# Patient Record
Sex: MALE | Race: WHITE | NOT HISPANIC OR LATINO | URBAN - METROPOLITAN AREA
[De-identification: names, ages, dates, MRNs, and addresses within clinical notes are randomized per-mention and may not be internally consistent; named-entity substitution may affect disease eponyms.]

---

## 2020-01-18 ENCOUNTER — EMERGENCY (EMERGENCY)
Facility: HOSPITAL | Age: 20
LOS: 1 days | Discharge: HOME | End: 2020-01-18
Attending: EMERGENCY MEDICINE | Admitting: EMERGENCY MEDICINE
Payer: COMMERCIAL

## 2020-01-18 VITALS
HEART RATE: 90 BPM | DIASTOLIC BLOOD PRESSURE: 78 MMHG | TEMPERATURE: 98 F | SYSTOLIC BLOOD PRESSURE: 125 MMHG | OXYGEN SATURATION: 94 % | RESPIRATION RATE: 18 BRPM

## 2020-01-18 PROCEDURE — 71045 X-RAY EXAM CHEST 1 VIEW: CPT | Mod: 26

## 2020-01-18 PROCEDURE — 99291 CRITICAL CARE FIRST HOUR: CPT

## 2020-01-18 RX ORDER — IPRATROPIUM/ALBUTEROL SULFATE 18-103MCG
3 AEROSOL WITH ADAPTER (GRAM) INHALATION
Refills: 0 | Status: COMPLETED | OUTPATIENT
Start: 2020-01-18 | End: 2020-01-18

## 2020-01-18 RX ORDER — EPINEPHRINE 0.3 MG/.3ML
0.3 INJECTION INTRAMUSCULAR; SUBCUTANEOUS ONCE
Refills: 0 | Status: COMPLETED | OUTPATIENT
Start: 2020-01-18 | End: 2020-01-18

## 2020-01-18 RX ORDER — ALBUTEROL 90 UG/1
2 AEROSOL, METERED ORAL
Qty: 1 | Refills: 0
Start: 2020-01-18

## 2020-01-18 RX ORDER — FAMOTIDINE 10 MG/ML
1 INJECTION INTRAVENOUS
Qty: 5 | Refills: 0
Start: 2020-01-18 | End: 2020-01-22

## 2020-01-18 RX ORDER — FAMOTIDINE 10 MG/ML
20 INJECTION INTRAVENOUS ONCE
Refills: 0 | Status: COMPLETED | OUTPATIENT
Start: 2020-01-18 | End: 2020-01-18

## 2020-01-18 RX ORDER — SODIUM CHLORIDE 9 MG/ML
1000 INJECTION INTRAMUSCULAR; INTRAVENOUS; SUBCUTANEOUS ONCE
Refills: 0 | Status: COMPLETED | OUTPATIENT
Start: 2020-01-18 | End: 2020-01-18

## 2020-01-18 RX ORDER — ONDANSETRON 8 MG/1
4 TABLET, FILM COATED ORAL ONCE
Refills: 0 | Status: COMPLETED | OUTPATIENT
Start: 2020-01-18 | End: 2020-01-18

## 2020-01-18 RX ORDER — EPINEPHRINE 0.3 MG/.3ML
0.3 INJECTION INTRAMUSCULAR; SUBCUTANEOUS
Qty: 2 | Refills: 0
Start: 2020-01-18

## 2020-01-18 RX ADMIN — SODIUM CHLORIDE 1000 MILLILITER(S): 9 INJECTION INTRAMUSCULAR; INTRAVENOUS; SUBCUTANEOUS at 21:36

## 2020-01-18 RX ADMIN — Medication 3 MILLILITER(S): at 21:38

## 2020-01-18 RX ADMIN — FAMOTIDINE 20 MILLIGRAM(S): 10 INJECTION INTRAVENOUS at 21:37

## 2020-01-18 RX ADMIN — Medication 3 MILLILITER(S): at 21:20

## 2020-01-18 RX ADMIN — EPINEPHRINE 0.3 MILLIGRAM(S): 0.3 INJECTION INTRAMUSCULAR; SUBCUTANEOUS at 21:35

## 2020-01-18 RX ADMIN — ONDANSETRON 4 MILLIGRAM(S): 8 TABLET, FILM COATED ORAL at 21:37

## 2020-01-18 NOTE — CONSULT NOTE ADULT - SUBJECTIVE AND OBJECTIVE BOX
19M PMH asthma, ADHD, known nut allergy presents to ED this evening with sensation of throat tightness. Patient was well until 8pm this evening when he ate pizza which had sesame seeds on it and possibly nut oil per patient, after which he began to experience the sensation of throat tightness. He administered his epi-pen and presented to the ED. On admission, noted to be wheezing, given epi / 19M PMH asthma, ADHD, known nut allergy presents to ED this evening with sensation of throat tightness. Patient was well until 8pm this evening when he ate pizza which had sesame seeds on it and possibly nut oil per patient, after which he began to experience the sensation of throat tightness. He administered his epi-pen and presented to the ED. On admission, noted to be wheezing, given epi / pepcid / benadryl / solumedrol and nebulizers with symptomatic improvement. Of note, patient recently diagnosed with lung infection (patient unsure of exact dx) and given oral abx and prednisone which he recently finished.     PAST MEDICAL & SURGICAL HISTORY:  Asthma    Allergies    No Known Drug Allergies  Nuts (Short breath; Hives)    Intolerances      Vital Signs Last 24 Hrs  T(C): 36.7 (18 Jan 2020 21:57), Max: 36.7 (18 Jan 2020 21:57)  T(F): 98.1 (18 Jan 2020 21:57), Max: 98.1 (18 Jan 2020 21:57)  HR: 86 (18 Jan 2020 21:57) (86 - 90)  BP: 131/68 (18 Jan 2020 21:57) (125/78 - 131/68)  BP(mean): --  RR: 16 (18 Jan 2020 21:57) (16 - 18)  SpO2: 96% (18 Jan 2020 21:57) (94% - 96%)          PHYSICAL EXAM:  NAD, alert, oriented   No rashes, bruises, or lesions or visible skin of head / neck   Nonlabored breathing on RA, no stridor, strong voice. +faint wheezing audible without ascultation   Face: no edema / erythema   Mouth: No edema of lips. No stridor / drooling.  Mucosa moist, tongue/uvula midline, oropharynx clear. No edema of floor of mouth. Very mild uvular edema     LARYNGOSCOPY EXAM:     -Verbal consent was obtained from patient prior to procedure.    Indication:    Anesthesia: Afrin spray was applied to the nasal cavities.    Flexible laryngoscopy was performed and revealed the following:    -- Nasopharynx had no mass or exudate.    -- Base of tongue was symmetric and not enlarged.    -- Vallecula was clear    -- Epiglottis, both aryepiglottic folds and both false vocal folds were normal    -- Arytenoids both without edema and erythema     -- True vocal folds were fully mobile and without lesions.     -- Post cricoid area was clear.    -- Interarytenoid edema was absent    The patient tolerated the procedure well.      A/P: 19M PMH asthma, ADHD, known nut allergy presents to ED this evening with sensation of throat tightness after eating food possibly containing nuts. Upper airway exam including scope exam wnl    - no acute ENT intervention   - remainder of workup per ED

## 2020-01-18 NOTE — ED PROVIDER NOTE - OBJECTIVE STATEMENT
20 y/o M with hx of ADHD, known nut allergy, with recent URI-type symptoms for past 1-2 weeks with mild hoarse voice and cough, on steroids, Keflex and anti-histamines. Pt was at dinner today when he started to feel some abd discomfort that felt similar to his prior allergic rxns. Pt attempted to make himself vomit in an effort to make himself feel better, but upon vomiting, still reported abdominal pain w/o any other allergic symptoms. Pt then reports that upon walking home, he began to feel throat tightness similar to prior reactions and gave himself his Epi pen and called 911. EMS administered 50mg of Benadryl, 125mg Solumedrol and neb treatment. Currently in ED, pt still endorses some abd discomfort although much improved. Pt also admits to mild nausea and is still experiencing hoarse voice, SOB and wheezing with throat tightness. Denies any itching, chest pain, or palpitations. Pt does repot feeling jittery but states that this is typical to his prior allergic rxns after he has used his epi pen. 18 y/o M with hx of ADHD, asthma, known nut allergy, with recent URI-type symptoms for past 1-2 weeks with sx including mild hoarse voice, st, congestion and cough, on steroids (last dose today), Keflex and anti-histamines. Pt was at dinner today when he started to feel some abd discomfort that felt similar to the beginning of his prior allergic rxns. Pt attempted to make himself vomit in an effort to make himself feel better, but upon vomiting, still reported abdominal pain w/o any other allergic symptoms. Pt then reports that upon walking home, he began to feel throat tightness similar to prior reactions and gave himself his Epi pen and called 911. EMS administered 50mg of Benadryl, 125mg Solumedrol and neb treatment. Currently in ED, pt still endorses some abd discomfort although much improved. Pt also admits to mild nausea and is still experiencing hoarse voice, mild SOB and wheezing with throat tightness sensation. No rash, itching, chest pain, or palpitations. Pt does repot feeling jittery but states that this is typical to his prior allergic rxns after he has used his epi pen.

## 2020-01-18 NOTE — ED PROVIDER NOTE - CARDIAC, MLM
Normal rate, regular rhythm.  Heart sounds S1, S2. rapid rate, regular rhythm.  Heart sounds S1, S2.

## 2020-01-18 NOTE — ED PROVIDER NOTE - NSFOLLOWUPINSTRUCTIONS_ED_ALL_ED_FT
Allergic reaction    Take prednisone and pepcid once a day for 5 days.  Use benadryl 2 tab every 4-6 hours for swelling/itch/rash.  Use the epipen as directed and immediately return to ED or call 911 for lip/tongue/throat swelling or difficulty breathing.  Return for increased or new symptoms, any other concerns.  Follow up with your pmd.     Anaphylaxis    An anaphylactic reaction (anaphylaxis) is a sudden, severe allergic reaction that affects multiple areas of the body. An allergic reaction is an abnormal reaction to a substance (allergen) by the body's defense system. Common allergens include medicines, food, insect bites or stings, and blood products. The body releases certain proteins into the blood that can cause a variety of symptoms such as an itchy rash, wheezing, swelling of the face/lips/tongue/throat, abdominal pain, nausea or vomiting. An allergic reaction is usually treated with medication. If your health care provider prescribed you an epinephrine injection device, make sure to keep it with you at all times.    SEEK IMMEDIATE MEDICAL CARE IF YOU HAVE ANY OF THE FOLLOWING SYMPTOMS: allergic reaction severe enough that required you to use epinephrine, tightness in your chest, swelling around your lips/tongue/throat, abdominal pain, vomiting or diarrhea, or lightheadedness/dizziness. These symptoms may represent a serious problem that is an emergency. Do not wait to see if the symptoms will go away. Use your auto-injector pen or anaphylaxis kit as you have been instructed. Call 911 and do not drive yourself to the hospital.

## 2020-01-18 NOTE — ED PROVIDER NOTE - PROGRESS NOTE DETAILS
EENT consulted and coming to evaluate pt. Pt scoped by EENT, airway normal and clear per their findings. ENT consulted and coming to evaluate pt. Pt scoped by EENT, airway normal and clear per their findings.  Pt feeling better.  Lung exam much improved.   CXR neg.  Will cont to monitor for 4-5 h from epi.  Pt will be signed out to Dr Chaparro and KATTY Vazquez at 11p pending re-eval. received sign out- pt states feeling well. awaiting re-evaluation 0230, Received s/o from Dr Moralez - hx asthma, current URI on abx / steroids, s/p allergic reaction w/ wheezing, throat tightness - received Epi / antihistamines / steroids, seen by ENT w/o airway edema, pending re-eval at 2:30 for possible dispo. Pt feeling much better. No rash. No edema. Pt w/ diffuse exp wheezing, good aeration. No tachypnea, hypoxia, or tachycardia. Breathes easily. No erythema or exudates in oropharynx. No tongue / lip / uvula / pharyngeal / sublingual edema. No oral lesions. Uvula is midline. No drooling or stridor. Normal phonation. Will give additional duoneb, re-eval PT feeling better. + much improved aeration, minimal wheezing. Will d/c w/ rx, return precautions given. Pt demonstrates understanding of plan

## 2020-01-18 NOTE — ED PROVIDER NOTE - RESPIRATORY, MLM
Breath sounds clear and equal bilaterally. Pt speaking full sentences. No respiratory distress. Breath sounds diffuse bilat wheezing, mod air movement.  Pt speaking full sentences. No respiratory distress.

## 2020-01-18 NOTE — ED ADULT NURSE NOTE - OBJECTIVE STATEMENT
Received a 19 year old male with a chief complaint of an allergic response after consuming pizza this evening. Patient with a known nut allergy. Patient self administered an Epi-Pen. Benadryl and steroids reported to have been administered by EMS en route. Patient presents to the ED with a reported swollen throat, and a harsh cough.

## 2020-01-18 NOTE — ED PROVIDER NOTE - CLINICAL SUMMARY MEDICAL DECISION MAKING FREE TEXT BOX
Pt with allergic rxn, still having throat tightness and hoarse voice although unclear if related to URI or allergic rxn. Lungs with diffuse wheezing, O2sat 87% on RA. Plan for additional Epi, Pepcid, Zofran and IV fluids. EENT consulted to evaluate airway. Will reassess for symptom improvement and continue to monitor.

## 2020-01-18 NOTE — ED ADULT TRIAGE NOTE - CHIEF COMPLAINT QUOTE
pt. c/o allergic reaction after eating pizza. self-administered epi-pen, 125 solu-medrol, 1 duo neb, 50 benadryl given by ems.

## 2020-01-18 NOTE — ED PROVIDER NOTE - PATIENT PORTAL LINK FT
You can access the FollowMyHealth Patient Portal offered by [facility] by registering at the following website: [email or web address]. By joining [email or web address]s FollowMyHealth portal, you will also be able to view your health information using other applications (apps) compatible with our system.

## 2020-01-18 NOTE — ED PROVIDER NOTE - ENMT, MLM
Airway patent, Nasal mucosa clear. Mouth with normal mucosa. Throat has no vesicles, no oropharyngeal exudates and uvula is midline. Slightly hoarse voice. No drooling or stridor. Posterior oropharynx non-erythematous. Non-edematous, midline uvula.

## 2020-01-18 NOTE — ED ADULT NURSE NOTE - NSIMPLEMENTINTERV_GEN_ALL_ED
Implemented All Universal Safety Interventions:  [place] to call system. Call bell, personal items and telephone within reach. Instruct patient to call for assistance. Room bathroom lighting operational. Non-slip footwear when patient is off stretcher. Physically safe environment: no spills, clutter or unnecessary equipment. Stretcher in lowest position, wheels locked, appropriate side rails in place.

## 2020-01-19 VITALS
HEART RATE: 72 BPM | DIASTOLIC BLOOD PRESSURE: 68 MMHG | RESPIRATION RATE: 16 BRPM | OXYGEN SATURATION: 100 % | SYSTOLIC BLOOD PRESSURE: 98 MMHG

## 2020-01-19 PROCEDURE — 96375 TX/PRO/DX INJ NEW DRUG ADDON: CPT

## 2020-01-19 PROCEDURE — 96372 THER/PROPH/DIAG INJ SC/IM: CPT | Mod: XU

## 2020-01-19 PROCEDURE — 96374 THER/PROPH/DIAG INJ IV PUSH: CPT

## 2020-01-19 PROCEDURE — 94640 AIRWAY INHALATION TREATMENT: CPT

## 2020-01-19 PROCEDURE — 71045 X-RAY EXAM CHEST 1 VIEW: CPT

## 2020-01-19 PROCEDURE — 99291 CRITICAL CARE FIRST HOUR: CPT | Mod: 25

## 2020-01-19 RX ORDER — IPRATROPIUM/ALBUTEROL SULFATE 18-103MCG
3 AEROSOL WITH ADAPTER (GRAM) INHALATION ONCE
Refills: 0 | Status: COMPLETED | OUTPATIENT
Start: 2020-01-19 | End: 2020-01-19

## 2020-01-19 RX ADMIN — Medication 3 MILLILITER(S): at 02:09

## 2020-01-25 DIAGNOSIS — T78.00XA ANAPHYLACTIC REACTION DUE TO UNSPECIFIED FOOD, INITIAL ENCOUNTER: ICD-10-CM

## 2020-01-25 DIAGNOSIS — R06.02 SHORTNESS OF BREATH: ICD-10-CM
